# Patient Record
Sex: FEMALE | Race: WHITE | Employment: FULL TIME | URBAN - METROPOLITAN AREA
[De-identification: names, ages, dates, MRNs, and addresses within clinical notes are randomized per-mention and may not be internally consistent; named-entity substitution may affect disease eponyms.]

---

## 2019-11-30 ENCOUNTER — APPOINTMENT (OUTPATIENT)
Dept: MRI IMAGING | Facility: CLINIC | Age: 29
End: 2019-11-30
Attending: EMERGENCY MEDICINE
Payer: COMMERCIAL

## 2019-11-30 ENCOUNTER — HOSPITAL ENCOUNTER (EMERGENCY)
Facility: CLINIC | Age: 29
Discharge: ANOTHER HOSPITAL | End: 2019-11-30
Attending: EMERGENCY MEDICINE | Admitting: EMERGENCY MEDICINE
Payer: COMMERCIAL

## 2019-11-30 ENCOUNTER — APPOINTMENT (OUTPATIENT)
Dept: CT IMAGING | Facility: CLINIC | Age: 29
End: 2019-11-30
Attending: EMERGENCY MEDICINE
Payer: COMMERCIAL

## 2019-11-30 ENCOUNTER — HOSPITAL ENCOUNTER (EMERGENCY)
Facility: CLINIC | Age: 29
Discharge: HOME | End: 2019-11-30
Attending: EMERGENCY MEDICINE | Admitting: EMERGENCY MEDICINE
Payer: COMMERCIAL

## 2019-11-30 VITALS
WEIGHT: 162.48 LBS | RESPIRATION RATE: 28 BRPM | HEART RATE: 75 BPM | OXYGEN SATURATION: 100 % | SYSTOLIC BLOOD PRESSURE: 138 MMHG | DIASTOLIC BLOOD PRESSURE: 122 MMHG | TEMPERATURE: 97.7 F

## 2019-11-30 VITALS
HEIGHT: 67 IN | OXYGEN SATURATION: 100 % | BODY MASS INDEX: 25.51 KG/M2 | RESPIRATION RATE: 14 BRPM | TEMPERATURE: 98.5 F | SYSTOLIC BLOOD PRESSURE: 120 MMHG | DIASTOLIC BLOOD PRESSURE: 66 MMHG | WEIGHT: 162.5 LBS | HEART RATE: 63 BPM

## 2019-11-30 DIAGNOSIS — R44.9 SENSORY DEFICIT, LEFT: ICD-10-CM

## 2019-11-30 DIAGNOSIS — R20.0 LEFT SIDED NUMBNESS: ICD-10-CM

## 2019-11-30 DIAGNOSIS — R41.0 CONFUSION: ICD-10-CM

## 2019-11-30 LAB
ANION GAP SERPL CALCULATED.3IONS-SCNC: 4 MMOL/L (ref 3–14)
APTT PPP: 29 SEC (ref 22–37)
BASOPHILS # BLD AUTO: 0.1 10E9/L (ref 0–0.2)
BASOPHILS NFR BLD AUTO: 0.7 %
BUN SERPL-MCNC: 11 MG/DL (ref 7–30)
CALCIUM SERPL-MCNC: 8.9 MG/DL (ref 8.5–10.1)
CHLORIDE SERPL-SCNC: 106 MMOL/L (ref 94–109)
CO2 SERPL-SCNC: 26 MMOL/L (ref 20–32)
CREAT SERPL-MCNC: 0.72 MG/DL (ref 0.52–1.04)
DIFFERENTIAL METHOD BLD: NORMAL
EOSINOPHIL # BLD AUTO: 0.3 10E9/L (ref 0–0.7)
EOSINOPHIL NFR BLD AUTO: 3.3 %
ERYTHROCYTE [DISTWIDTH] IN BLOOD BY AUTOMATED COUNT: 12.6 % (ref 10–15)
GFR SERPL CREATININE-BSD FRML MDRD: >90 ML/MIN/{1.73_M2}
GLUCOSE BLDC GLUCOMTR-MCNC: 88 MG/DL (ref 70–99)
GLUCOSE BLDC GLUCOMTR-MCNC: 89 MG/DL (ref 70–99)
GLUCOSE SERPL-MCNC: 85 MG/DL (ref 70–99)
HCT VFR BLD AUTO: 43.1 % (ref 35–47)
HGB BLD-MCNC: 14.7 G/DL (ref 11.7–15.7)
IMM GRANULOCYTES # BLD: 0 10E9/L (ref 0–0.4)
IMM GRANULOCYTES NFR BLD: 0.3 %
INR PPP: 0.98 (ref 0.86–1.14)
LYMPHOCYTES # BLD AUTO: 3 10E9/L (ref 0.8–5.3)
LYMPHOCYTES NFR BLD AUTO: 32.8 %
MCH RBC QN AUTO: 32.4 PG (ref 26.5–33)
MCHC RBC AUTO-ENTMCNC: 34.1 G/DL (ref 31.5–36.5)
MCV RBC AUTO: 95 FL (ref 78–100)
MONOCYTES # BLD AUTO: 0.5 10E9/L (ref 0–1.3)
MONOCYTES NFR BLD AUTO: 5.2 %
NEUTROPHILS # BLD AUTO: 5.3 10E9/L (ref 1.6–8.3)
NEUTROPHILS NFR BLD AUTO: 57.7 %
NRBC # BLD AUTO: 0 10*3/UL
NRBC BLD AUTO-RTO: 0 /100
PLATELET # BLD AUTO: 368 10E9/L (ref 150–450)
POTASSIUM SERPL-SCNC: 4.2 MMOL/L (ref 3.4–5.3)
RBC # BLD AUTO: 4.54 10E12/L (ref 3.8–5.2)
SODIUM SERPL-SCNC: 136 MMOL/L (ref 133–144)
TROPONIN I SERPL-MCNC: <0.015 UG/L (ref 0–0.04)
WBC # BLD AUTO: 9.2 10E9/L (ref 4–11)

## 2019-11-30 PROCEDURE — 99285 EMERGENCY DEPT VISIT HI MDM: CPT | Mod: 25

## 2019-11-30 PROCEDURE — 70498 CT ANGIOGRAPHY NECK: CPT

## 2019-11-30 PROCEDURE — 99285 EMERGENCY DEPT VISIT HI MDM: CPT | Mod: 25 | Performed by: EMERGENCY MEDICINE

## 2019-11-30 PROCEDURE — 25000125 ZZHC RX 250: Performed by: EMERGENCY MEDICINE

## 2019-11-30 PROCEDURE — 70553 MRI BRAIN STEM W/O & W/DYE: CPT

## 2019-11-30 PROCEDURE — 93005 ELECTROCARDIOGRAM TRACING: CPT | Performed by: EMERGENCY MEDICINE

## 2019-11-30 PROCEDURE — 85610 PROTHROMBIN TIME: CPT | Performed by: EMERGENCY MEDICINE

## 2019-11-30 PROCEDURE — 70450 CT HEAD/BRAIN W/O DYE: CPT | Mod: XS

## 2019-11-30 PROCEDURE — 85730 THROMBOPLASTIN TIME PARTIAL: CPT | Performed by: EMERGENCY MEDICINE

## 2019-11-30 PROCEDURE — 25500064 ZZH RX 255 OP 636: Performed by: EMERGENCY MEDICINE

## 2019-11-30 PROCEDURE — 85025 COMPLETE CBC W/AUTO DIFF WBC: CPT | Performed by: EMERGENCY MEDICINE

## 2019-11-30 PROCEDURE — 25000128 H RX IP 250 OP 636: Performed by: EMERGENCY MEDICINE

## 2019-11-30 PROCEDURE — 93005 ELECTROCARDIOGRAM TRACING: CPT

## 2019-11-30 PROCEDURE — 84484 ASSAY OF TROPONIN QUANT: CPT | Performed by: EMERGENCY MEDICINE

## 2019-11-30 PROCEDURE — 70546 MR ANGIOGRAPH HEAD W/O&W/DYE: CPT | Mod: XS

## 2019-11-30 PROCEDURE — 93010 ELECTROCARDIOGRAM REPORT: CPT | Mod: Z6 | Performed by: EMERGENCY MEDICINE

## 2019-11-30 PROCEDURE — 80048 BASIC METABOLIC PNL TOTAL CA: CPT | Performed by: EMERGENCY MEDICINE

## 2019-11-30 PROCEDURE — 99291 CRITICAL CARE FIRST HOUR: CPT | Mod: 25 | Performed by: EMERGENCY MEDICINE

## 2019-11-30 PROCEDURE — 00000146 ZZHCL STATISTIC GLUCOSE BY METER IP

## 2019-11-30 PROCEDURE — A9585 GADOBUTROL INJECTION: HCPCS | Performed by: EMERGENCY MEDICINE

## 2019-11-30 RX ORDER — GADOBUTROL 604.72 MG/ML
10 INJECTION INTRAVENOUS ONCE
Status: COMPLETED | OUTPATIENT
Start: 2019-11-30 | End: 2019-11-30

## 2019-11-30 RX ORDER — IOPAMIDOL 755 MG/ML
70 INJECTION, SOLUTION INTRAVASCULAR ONCE
Status: COMPLETED | OUTPATIENT
Start: 2019-11-30 | End: 2019-11-30

## 2019-11-30 RX ADMIN — SODIUM CHLORIDE 100 ML: 9 INJECTION, SOLUTION INTRAVENOUS at 15:31

## 2019-11-30 RX ADMIN — IOPAMIDOL 70 ML: 755 INJECTION, SOLUTION INTRAVENOUS at 15:31

## 2019-11-30 RX ADMIN — GADOBUTROL 10 ML: 604.72 INJECTION INTRAVENOUS at 21:21

## 2019-11-30 ASSESSMENT — ENCOUNTER SYMPTOMS
TROUBLE SWALLOWING: 0
DIZZINESS: 0
FEVER: 0
DYSURIA: 0
HEADACHES: 0
COUGH: 0
FACIAL ASYMMETRY: 0
CHOKING: 0
NUMBNESS: 1
VOICE CHANGE: 0
NECK PAIN: 0
NECK STIFFNESS: 0
NUMBNESS: 1
ABDOMINAL PAIN: 0
HEADACHES: 0
DIZZINESS: 0
EYES NEGATIVE: 1

## 2019-11-30 ASSESSMENT — MIFFLIN-ST. JEOR: SCORE: 1489.73

## 2019-11-30 NOTE — CONSULTS
Stroke Consult:    Discussed case with Dr. Napoles at [facility] ED on the telephone.    39 yo female who is two weeks post-partum with .  At 39 weeks while pregnant had an episode where she lost vision aand had numbness on the right side.  She was hypoglycemic and symptoms resolved.  Today had onset of left face and arm spreading numbness.  No history of migraine aura.    Head CT with small left thalamic hypodensity which correlates clinically, but does not fit will in terms of onset/timing.  CTA unrevealing.    Recommendations:  1. No emergent treatment  2. Brain MRI w/wo contrast  3. MR Venogram  --she will likely need transfer to [facility] ED for this work-up.     Evan Aj MD, MS  Neurology    Please contact the stroke service with any questions: link to Text page

## 2019-11-30 NOTE — ED NOTES
"Pt discussing with pharmist regarding breast feeding and CT contrast. \" Low execretion, but if patient desires pump and dump, would be excreted 12-24 hrs post contrast\" Pt given pump from OB and supplies to pump.   "

## 2019-11-30 NOTE — ED PROVIDER NOTES
History     Chief Complaint   Patient presents with     Numbness     Pt c/o change in mentation and left sided numbness      HPI  Sigrid Patterson is a 29 year old female with no reported past medical history, 2 weeks postpartum, who presents with decreased sensation and left lower face and left arm.  Time of onset of symptoms was at 2 PM today.  She says she was eating at the time, denies any change in speech, or difficulty swallowing.  Also denies any motor weakness.  Says she had a similar episode about 3 weeks ago and was found to be hypoglycemic and symptoms improved with resolution of hypoglycemia.  Did not have CT scan at that time due to resolution of symptoms and pregnancy.  Not currently taking medications.  No known drug allergies.  Non-smoker.    The patient's PMHx, Surgical Hx, Allergies, and Medications were all reviewed with the patient.    Allergies:  No Known Allergies    Problem List:    There are no active problems to display for this patient.       Past Medical History:    No past medical history on file.    Past Surgical History:    No past surgical history on file.    Family History:    No family history on file.    Social History:  Marital Status:   [2]  Social History     Tobacco Use     Smoking status: Not on file   Substance Use Topics     Alcohol use: Not on file     Drug use: Not on file        Medications:    No current outpatient medications on file.        Review of Systems   Constitutional: Negative for fever.   HENT: Negative for drooling, trouble swallowing and voice change.    Eyes: Negative.    Respiratory: Negative for cough and choking.    Cardiovascular: Negative for chest pain.   Gastrointestinal: Negative for abdominal pain.   Endocrine: Negative for polyuria.   Genitourinary: Negative for dysuria.   Musculoskeletal: Negative for neck pain and neck stiffness.   Skin: Negative for rash.   Allergic/Immunologic: Negative for immunocompromised state.   Neurological:  Positive for numbness. Negative for dizziness, facial asymmetry and headaches.         Physical Exam   BP: 128/74  Pulse: 91  Heart Rate: 75  Temp: 97.7  F (36.5  C)  Resp: 16  Weight: 73.7 kg (162 lb 7.7 oz)  SpO2: 99 %    Physical Exam  GEN: Awake, alert, and cooperative. No acute distress  HENT: MMM. External ears and nose normal bilaterally.  Atraumatic.  EYES: EOM intact. Conjunctiva clear.  No RAPD.  Visual fields intact to direct confrontation.    NECK: Supple, symmetric.  Nontender.  CV : Regular rate and rhythm  PULM: Normal effort. No wheezes, rales, or rhonchi bilaterally.  ABD: Soft, non-tender, non-distended. No rebound or guarding.   NEURO: Mental status: Alert, awake. Oriented to self, date, and place. Normal speech and language.   Cranial Nerves: Decreased sensation in V2 V3 distribution on the left  Motor: Follows commands x 4 extremities   Upper Extremities:   RUE: 5/5 shoulder abduction. 5/5 elbow flex/ext. 5/5 wrist flex/ext. 5/5 hand .   LUE: 5/5 shoulder abduction. 5/5 elbow flex/ext. 5/5 wrist flex/ext. 5/5 hand .    Lower Extremities:   RLE: 5/5 hip flexion. 5/5 knee flex/ext. 5/5 ankle plantar-/dorsiflexion. 5/5 EHL.   LLE: 5/5 hip flexion. 5/5 knee flex/ext. 5/5 ankle plantar-/dorsiflexion. 5/5 EHL   Sensory: Decreased sensation in the left upper extremity.  Coordination: Rapid alternating movements intact. Finger-nose finger intact. Heel-shin intact.    EXT: No gross deformity. Warm and well perfused  INT: Warm. No diaphoresis. Normal color.        ED Course        Procedures             EKG Interpretation:      Interpreted by Ash Napoles MD  Time reviewed: 1550  Symptoms at time of EKG: decreased sensation.    Rhythm: normal sinus   Rate: normal  Axis: normal  Ectopy: none  Conduction: normal  ST Segments/ T Waves: No ST-T wave changes  Q Waves: none  Comparison to prior: No old EKG available    Clinical Impression: normal EKG      Critical Care time:  was 30 minutes for  this patient excluding procedures.     The patient has stroke symptoms:           ED Stroke specific documentation           NIHSS PDF          Protocol PDF     Patient last known well time: 1400  ED Provider first to bedside at: 1500  CT Results received at: 1554    tPA:   Not given due to minor / isolated / quickly resolving stroke symptoms.    If treating with tPA: Ensure SBP<185 and DBP<105 prior to treatment with IV tPA.  Administering IV tPA after treatment with IV labetalol, hydralazine, or nicardipine is reasonable once BP control is established.    Endovascular Retrieval:  Not initiated due to absence of proximal vessel occlusion    National Institutes of Health Stroke Scale (Baseline)  Time Performed: 1500      Score    Level of consciousness: (0)   Alert, keenly responsive    LOC questions: (0)   Answers both questions correctly    LOC commands: (0)   Performs both tasks correctly    Best gaze: (0)   Normal    Visual: (0)   No visual loss    Facial palsy: (0)   Normal symmetrical movements    Motor arm (left): (0)   No drift    Motor arm (right): (0)   No drift    Motor leg (left): (0)   No drift    Motor leg (right): (0)   No drift    Limb ataxia: (0)   Absent    Sensory: (1)   Mild to moderate sensory loss    Best language: (0)   Normal- no aphasia    Dysarthria: (0)   Normal    Extinction and inattention: (0)   No abnormality        Total Score:  1        Stroke Mimics were considered (including migraine headache, seizure disorder, hypoglycemia (or hyperglycemia), head or spinal trauma, CNS infection, Toxin ingestion and shock state (e.g. sepsis) .                 Results for orders placed or performed during the hospital encounter of 11/30/19 (from the past 24 hour(s))   Glucose by meter   Result Value Ref Range    Glucose 88 70 - 99 mg/dL   CBC with platelets differential   Result Value Ref Range    WBC 9.2 4.0 - 11.0 10e9/L    RBC Count 4.54 3.8 - 5.2 10e12/L    Hemoglobin 14.7 11.7 - 15.7 g/dL     Hematocrit 43.1 35.0 - 47.0 %    MCV 95 78 - 100 fl    MCH 32.4 26.5 - 33.0 pg    MCHC 34.1 31.5 - 36.5 g/dL    RDW 12.6 10.0 - 15.0 %    Platelet Count 368 150 - 450 10e9/L    Diff Method Automated Method     % Neutrophils 57.7 %    % Lymphocytes 32.8 %    % Monocytes 5.2 %    % Eosinophils 3.3 %    % Basophils 0.7 %    % Immature Granulocytes 0.3 %    Nucleated RBCs 0 0 /100    Absolute Neutrophil 5.3 1.6 - 8.3 10e9/L    Absolute Lymphocytes 3.0 0.8 - 5.3 10e9/L    Absolute Monocytes 0.5 0.0 - 1.3 10e9/L    Absolute Eosinophils 0.3 0.0 - 0.7 10e9/L    Absolute Basophils 0.1 0.0 - 0.2 10e9/L    Abs Immature Granulocytes 0.0 0 - 0.4 10e9/L    Absolute Nucleated RBC 0.0    Basic metabolic panel   Result Value Ref Range    Sodium 136 133 - 144 mmol/L    Potassium 4.2 3.4 - 5.3 mmol/L    Chloride 106 94 - 109 mmol/L    Carbon Dioxide 26 20 - 32 mmol/L    Anion Gap 4 3 - 14 mmol/L    Glucose 85 70 - 99 mg/dL    Urea Nitrogen 11 7 - 30 mg/dL    Creatinine 0.72 0.52 - 1.04 mg/dL    GFR Estimate >90 >60 mL/min/[1.73_m2]    GFR Estimate If Black >90 >60 mL/min/[1.73_m2]    Calcium 8.9 8.5 - 10.1 mg/dL   INR   Result Value Ref Range    INR 0.98 0.86 - 1.14   Partial thromboplastin time   Result Value Ref Range    PTT 29 22 - 37 sec   Troponin I   Result Value Ref Range    Troponin I ES <0.015 0.000 - 0.045 ug/L   CT Head w/o Contrast    Narrative    CT SCAN OF THE HEAD WITHOUT CONTRAST   11/30/2019 3:26 PM     HISTORY: Focal neuro deficit, < 6 hours, stroke suspected.    TECHNIQUE:  Axial images of the head and coronal reformations without  IV contrast material. Radiation dose for this scan was reduced using  automated exposure control, adjustment of the mA and/or kV according  to patient size, or iterative reconstruction technique.    COMPARISON: None.    FINDINGS: There is a small indeterminate focus of hypodensity in the  anterolateral aspect of the right thalamus (series 3 image 14).  Otherwise, the cerebral hemispheres,  brainstem and cerebellum appear  within normal limits on unenhanced CT for the patient's age. No  intracranial hemorrhage or mass effect. The basal cisterns are patent.    Orbits appear normal. The paranasal sinuses are free of significant  disease.      Impression    IMPRESSION: Small indeterminate hypodense focus in the anterolateral  aspect of the right thalamus. This could represent an early infarct.  MRI is recommended for further characterization.    The findings from the noncontrast head CT and CT angiogram were  discussed by phone by Dr. Willis with Dr. Napoles at approximately  3:54 PM on 11/30/2019.    CHETAN WILLIS MD   Glucose by meter   Result Value Ref Range    Glucose 89 70 - 99 mg/dL   CTA Head Neck with Contrast    Narrative    CT ANGIOGRAM OF THE HEAD AND NECK WITH CONTRAST  11/30/2019 4:01 PM     HISTORY: Concern for CVA.     TECHNIQUE:  CT angiography with an injection of 70 mL Isovue 370 IV  with scans through the head and neck. Images were transferred to a  separate 3-D workstation where multiplanar reformations and 3-D images  were created. Estimates of carotid stenoses are made relative to the  distal internal carotid artery diameters except as noted. Radiation  dose for this scan was reduced using automated exposure control,  adjustment of the mA and/or kV according to patient size, or iterative  reconstruction technique.    COMPARISON: None.     CT HEAD FINDINGS: No contrast enhancing lesions. Cerebral blood flow  is grossly normal.     CT ANGIOGRAM HEAD FINDINGS:  The major intracranial arteries including  the proximal branches of the anterior cerebral, middle cerebral, and  posterior cerebral arteries appear patent without vascular cutoff. No  aneurysm identified. No significant stenosis. Venous circulation is  unremarkable.     CT ANGIOGRAM NECK FINDINGS:   Normal origin of the great vessels from the aortic arch.     Right carotid artery: The right common and internal carotid  arteries  are patent. No significant stenosis or atherosclerotic disease in the  carotid artery.     Left carotid artery: The left common and internal carotid arteries are  patent. No significant stenosis or atherosclerotic disease in the  carotid artery.     Vertebral arteries: Vertebral arteries are patent without evidence of  dissection. No significant stenosis.     Other findings: None.       Impression    IMPRESSION: Patent arteries in the head and neck without vascular  cutoff. No evidence of dissection. No aneurysm identified. No  significant stenosis.         Medications   iopamidol (ISOVUE-370) solution 70 mL (70 mLs Intravenous Given 11/30/19 1531)   sodium chloride 0.9 % bag 500mL for CT scan flush use (100 mLs As instructed Given 11/30/19 1531)       Assessments & Plan (with Medical Decision Making)   29 year old female who is 2 weeks postpartum with no reported past medical history presents with 2 PM onset of decreased sensation on left lower face and left upper extremity.  Called to room for stroke evaluation.  On arrival to Emergency Department, vital signs were within normal limits.  NIH stroke scale of 1 due to decreased sensation in left upper extremity as well as left side of face.  No motor deficits, dysarthria or change in mental status.  Stroke code called and CT and CTA obtained.  I spoke with Dr. Aj from stroke neurology at 1515 to discuss presentation.  CTA without large vessel occlusion and CT noncontrast with small indeterminate hypodensity in right hypothalamus.  Initial lab work unremarkable.  ECG with normal sinus rhythm.  Given that she is 2 weeks postpartum also concern for venous occlusion as well as demyelinating disorder.  Will obtain MR brain with and without contrast as well as MRV.  Unfortunately MRI not available at [place] at this time and patient would require transfer.  MR is available at [facility].  Plan to transfer patient directly to emergency department  with plan for MR.  If MRI is negative plan for her to be discharged emergency department with appropriate outpatient follow-up.  If any positive findings on MR, then appropriate disposition decision at that time.  Dr. Willis from neuroradiology and Dr. Aj from stroke neurology are covering at [facility] this weekend.  Patient is agreeable to plan for transfer for MR.  CT results discussed with patient as well as her  and imaging reviewed in room.  Spoke with Dr. Linder from [facility] who accepted the transfer.    I have reviewed the nursing notes.         There are no discharge medications for this patient.      Final diagnoses:   Sensory deficit, left     Ash Napoles MD    11/30/2019   [facility] EMERGENCY DEPARTMENT    Disclaimer: This note consists of words and symbols derived from keyboarding and dictation using voice recognition software.  As a result, there may be errors that have gone undetected.  Please consider this when interpreting information found in this note.     Ash Napoles MD  11/30/19 1001

## 2019-11-30 NOTE — ED NOTES
"MD Napoles at bedside. Pt is a  who delivered at 39 weeks . Had an episode at 39 weeks \" where I had loss of vision, and right side numbness and tingling. I couldn't be imaged due to pregnancy and my blood sugar was low- symptoms resolved and they thought it was attributed to that. 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 But this time it is my left side. No vision changes, but left facial numbness and left arm numbness\"   Neuro exam WNL except decreased sensation on left arm, left face. PERRL, Speech clear.   "

## 2019-11-30 NOTE — ED AVS SNAPSHOT
Emergency Department  [address] [place] [id number]  Phone:  [phone]  Fax:  [phone]                                    Sigrid Patterson   MRN: [id number]    Department:   Emergency Department   Date of Visit:  11/30/2019           After Visit Summary Signature Page    I have received my discharge instructions, and my questions have been answered. I have discussed any challenges I see with this plan with the nurse or doctor.    ..........................................................................................................................................  Patient/Patient Representative Signature      ..........................................................................................................................................  Patient Representative Print Name and Relationship to Patient    ..................................................               ................................................  Date                                   Time    ..........................................................................................................................................  Reviewed by Signature/Title    ...................................................              ..............................................  Date                                               Time          22EPIC Rev 08/18

## 2019-12-01 NOTE — DISCHARGE INSTRUCTIONS
Please follow up with your/a neurologist to discuss your symptoms. Return to the ED with any new or concerning symptoms.

## 2019-12-01 NOTE — ED TRIAGE NOTES
2 weeks postpartum, c/o sudden left facial numbness and left side weakness since 1400 today. Was seen at [facility] lab work and CT was done. CT is abnormal, they transferred her here for MRI.  Now symptoms resolved, A&Ox4, respirations even and unlabored, skin dry, warm, color wnl. No obvious neuro deficits noted. VSS

## 2019-12-01 NOTE — ED PROVIDER NOTES
"  History     Chief Complaint:  Altered Mental Status       Sigrid Patterson is a 29 year old female who presents to the emergency department for evaluation of numbness. The patient arrives today via EMS from [facility] for an MRI. The patient was 39 weeks pregnant 3 weeks ago when she was hypoglycemic and experienced right vision loss, confusion, and right hand and face numbness. She is now 2 weeks post partum and once again began to notice a mental status change, and numbness in her left arm and face. She notes slight hemorrhaging during child birth. The patient reports numbness, and altered mental status. The patient denies headaches, visual disturbance, ear pain, or dizziness.    Allergies:  No known drug allergies    Medications:    Colace  Adalat    Past Medical History:    Low-lying placenta  Cystic acne  Anxiety    Past Surgical History:    [place] teeth extraction  Tonsillectomy    Family History:    No past pertinent family history.    Social History:  The patient presents today alone.  Never smoker  Negative for alcohol use.  Marital Status:      Review of Systems   HENT: Negative for ear pain.    Eyes: Negative for visual disturbance.   Neurological: Positive for numbness. Negative for dizziness and headaches.   Psychiatric/Behavioral:        Altered mental status   All other systems reviewed and are negative.      Physical Exam     Patient Vitals for the past 24 hrs:   BP Temp Temp src Pulse Heart Rate Resp SpO2 Height Weight   11/30/19 1931 127/73 98.5  F (36.9  C) Oral 71 71 14 98 % 1.702 m (5' 7\") 73.7 kg (162 lb 8 oz)     Physical Exam  Eyes:  The pupils are equal and round    Conjunctivae and sclerae are normal  ENT:    The nose is normal    Pinnae are normal  Neck:  Normal range of motion    There is no rigidity noted    There is no midline cervical spine tenderness    Trachea is in the midline  CV:  Regular rate and rhythm     No edema  Resp:  Lungs are clear    Non-labored    No " rales    No wheezing   GI:  Abdomen is soft, there is no rigidity    No distension    No rebound tenderness   MS:  Normal muscular tone    No asymmetric leg swelling  Skin:  No rash or acute skin lesions noted  Neuro:   Awake, alert, GCS 15    Speech is normal and fluent    Face is symmetric    SILT normal bilateral face, patient reports some change in sensation in her left cheek    Moves all extremities    Normal finger-nose-finger     strength equal bilaterally    Equal sensation bilaterally    Hip flexion 5/5 bilaterally        Emergency Department Course     ECG:  Time: 1545  Vent. Rate 78 bpm. RI interval 154. QRS duration 88. QT/QTc 376/407. P-R-T axis 59 25 54.  Sinus rhythm  Within normal limits    Imaging:  Radiology findings were communicated with the patient who voiced understanding of the findings.    MRV Brain wo Contrast:  Normal MR venogram of the head.   As per radiology.    MR Brain w/o & w Contrast:  1. No acute intracranial abnormality. Specifically, no acute infarct,  hemorrhage, or mass effect.  2. Small nonspecific focus of T2 hyperintense signal in the right  parietal corona radiata.  3. Otherwise normal brain MRI.  As per radiology.    Interventions:  2121 Gadavist 10 mL IV    Emergency Department Course:    1545 EKG obtained as noted above.    1928 Nursing notes and vitals reviewed.    1932 I performed an exam of the patient as documented above.     2044 The patient was sent for a MRV Brain wo Contrast while in the emergency department, results above.     2045 The patient was sent for a MR Brain w/o & w Contrast while in the emergency department, results above.     2226 Patient rechecked and updated.     Findings and plan explained to the Patient. Patient discharged home with instructions regarding supportive care, medications, and reasons to return. The importance of close follow-up was reviewed.    Impression & Plan     Medical Decision Making:  Sigrid Patterson is a 29 year old female  who presents to the emergency department today with abnormal CT scan and confusional episode earlier today.  She was referred down here to [facility] for MRI imaging from outside hospital.  She recently had given childbirth.  Mental status is normal here.  She has a fairly normal neurologic exam.  She reports some numbness sensation in her left cheek which is less than what she had prior to coming here.  MRI of the brain with and without contrast as well as MRV of the brain were obtained to evaluate for any abnormalities per recommendation from stroke neurology.  MRI results were reviewed with stroke neurology and are likely contributing to her symptoms.  He also appears that she may had this signal abnormality on her prior MRI of the with clear based on the 2 reports.  Recommend patient follow-up with outpatient neurology from here.  She is feeling back to normal.  Return to the emergency department with any new or concerning symptoms.    Addendum: I was called by reading radiologist. 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 I called this result to the patient and recommended that she follow up this result with her neurology follow up.    Discharge Diagnosis:    ICD-10-CM    1. Left sided numbness R20.0    2. Confusion R41.0      Disposition:  The patient is discharged to home.    Scribe Disclosure:  I, Yoel Liudmila, am serving as a scribe at 7:33 PM on 11/30/2019 to document services personally performed by Roger Mederos MD based on my observations and the provider's statements to me.      Roger Mederos MD  11/30/19 2314       Roger Mederos MD  11/30/19 2843       Roger Mederos MD  12/01/19 [id number]

## 2020-03-11 ENCOUNTER — HEALTH MAINTENANCE LETTER (OUTPATIENT)
Age: 30
End: 2020-03-11

## 2021-01-04 ENCOUNTER — HEALTH MAINTENANCE LETTER (OUTPATIENT)
Age: 31
End: 2021-01-04

## 2021-04-25 ENCOUNTER — HEALTH MAINTENANCE LETTER (OUTPATIENT)
Age: 31
End: 2021-04-25

## 2021-10-10 ENCOUNTER — HEALTH MAINTENANCE LETTER (OUTPATIENT)
Age: 31
End: 2021-10-10

## 2022-05-22 ENCOUNTER — HEALTH MAINTENANCE LETTER (OUTPATIENT)
Age: 32
End: 2022-05-22

## 2022-09-18 ENCOUNTER — HEALTH MAINTENANCE LETTER (OUTPATIENT)
Age: 32
End: 2022-09-18

## 2023-06-04 ENCOUNTER — HEALTH MAINTENANCE LETTER (OUTPATIENT)
Age: 33
End: 2023-06-04